# Patient Record
Sex: MALE | URBAN - METROPOLITAN AREA
[De-identification: names, ages, dates, MRNs, and addresses within clinical notes are randomized per-mention and may not be internally consistent; named-entity substitution may affect disease eponyms.]

---

## 2020-06-05 ENCOUNTER — OFFICE VISIT (OUTPATIENT)
Dept: URBAN - METROPOLITAN AREA CLINIC 80 | Facility: CLINIC | Age: 25
End: 2020-06-05
Payer: COMMERCIAL

## 2020-06-05 ENCOUNTER — DASHBOARD ENCOUNTERS (OUTPATIENT)
Age: 25
End: 2020-06-05

## 2020-06-05 ENCOUNTER — LAB OUTSIDE AN ENCOUNTER (OUTPATIENT)
Dept: URBAN - METROPOLITAN AREA CLINIC 80 | Facility: CLINIC | Age: 25
End: 2020-06-05

## 2020-06-05 DIAGNOSIS — R63.4 ABNORMAL WEIGHT LOSS: ICD-10-CM

## 2020-06-05 DIAGNOSIS — R19.4 CHANGE IN BOWEL HABITS: ICD-10-CM

## 2020-06-05 DIAGNOSIS — R11.2 NAUSEA & VOMITING: ICD-10-CM

## 2020-06-05 DIAGNOSIS — R10.13 EPIGASTRIC PAIN: ICD-10-CM

## 2020-06-05 PROCEDURE — 99204 OFFICE O/P NEW MOD 45 MIN: CPT | Performed by: INTERNAL MEDICINE

## 2020-06-05 PROCEDURE — 99244 OFF/OP CNSLTJ NEW/EST MOD 40: CPT | Performed by: INTERNAL MEDICINE

## 2020-06-05 RX ORDER — SODIUM, POTASSIUM,MAG SULFATES 17.5-3.13G
177ML SOLUTION, RECONSTITUTED, ORAL ORAL
Qty: 177 MILLILITER | Refills: 0 | OUTPATIENT
Start: 2020-06-05

## 2020-06-05 RX ORDER — PANTOPRAZOLE SODIUM 40 MG/1
1 TABLET TABLET, DELAYED RELEASE ORAL ONCE A DAY
Qty: 90 | Refills: 3 | OUTPATIENT
Start: 2020-06-05

## 2020-06-05 RX ORDER — FAMOTIDINE 20 MG/1
1 TABLET AT BEDTIME AS NEEDED TABLET, FILM COATED ORAL ONCE A DAY
Qty: 30 | OUTPATIENT
Start: 2020-06-05

## 2020-06-05 RX ORDER — OMEPRAZOLE 40 MG/1
1 CAPSULE 30 MINUTES BEFORE MORNING MEAL CAPSULE, DELAYED RELEASE ORAL ONCE A DAY
Status: ACTIVE | COMMUNITY

## 2020-06-05 RX ORDER — METOCLOPRAMIDE 10 MG/1
1 TABLET BEFORE MEALS TABLET ORAL TWICE A DAY
Status: ACTIVE | COMMUNITY

## 2020-06-05 RX ORDER — ONDANSETRON HYDROCHLORIDE 4 MG/1
1 TABLET TABLET, FILM COATED ORAL ONCE A DAY
Status: ACTIVE | COMMUNITY

## 2020-06-05 NOTE — HPI-TODAY'S VISIT:
pt with symptoms of epigastric burning with throat burning and dysphagia started 3 weeks ago assoc n/v reduced appetite seen in the ER twice with normal labs and given prilosec and reglan he noted some improvement given zofran also which help the nausea given carafate but said it caused leg cramps for him assoc dysphagia to solids assoc diarrhea and constipation now with dark green stool no blood has lost 5 lb in the interim.   ER work upon 5/8/2020 with normal cbc.  cmp with elevated t bili at 1.9, lipase of 65, negative covid test

## 2020-06-05 NOTE — PHYSICAL EXAM GASTROINTESTINAL
Abdomen , soft, mild epigastric pressure but nontender, nondistended , no guarding or rigidity , no masses palpable , normal bowel sounds , Liver and Spleen , no hepatomegaly present , no hepatosplenomegaly , liver nontender , spleen not palpable

## 2020-06-06 LAB
A/G RATIO: 2.1
ALBUMIN: 5.2
ALKALINE PHOSPHATASE: 42
ALT (SGPT): 17
AST (SGOT): 20
BASO (ABSOLUTE): 0
BASOS: 1
BILIRUBIN, TOTAL: 1.8
BUN/CREATININE RATIO: 11
BUN: 12
CALCIUM: 10.1
CARBON DIOXIDE, TOTAL: 24
CHLORIDE: 100
CREATININE: 1.06
EGFR IF AFRICN AM: 111
EGFR IF NONAFRICN AM: 96
EOS (ABSOLUTE): 0.1
EOS: 3
GLOBULIN, TOTAL: 2.5
GLUCOSE: 77
HEMATOCRIT: 44.3
HEMATOLOGY COMMENTS:: (no result)
HEMOGLOBIN: 14.3
IMMATURE CELLS: (no result)
IMMATURE GRANS (ABS): 0
IMMATURE GRANULOCYTES: 0
LYMPHS (ABSOLUTE): 1.6
LYMPHS: 42
MCH: 28.4
MCHC: 32.3
MCV: 88
MONOCYTES(ABSOLUTE): 0.4
MONOCYTES: 11
NEUTROPHILS (ABSOLUTE): 1.6
NEUTROPHILS: 43
NRBC: (no result)
PLATELETS: 210
POTASSIUM: 4.5
PROTEIN, TOTAL: 7.7
RBC: 5.03
RDW: 13.1
SODIUM: 139
WBC: 3.7

## 2020-06-16 ENCOUNTER — OFFICE VISIT (OUTPATIENT)
Dept: URBAN - METROPOLITAN AREA SURGERY CENTER 19 | Facility: SURGERY CENTER | Age: 25
End: 2020-06-16

## 2020-07-14 ENCOUNTER — OFFICE VISIT (OUTPATIENT)
Dept: URBAN - METROPOLITAN AREA SURGERY CENTER 19 | Facility: SURGERY CENTER | Age: 25
End: 2020-07-14